# Patient Record
Sex: FEMALE | Race: WHITE
[De-identification: names, ages, dates, MRNs, and addresses within clinical notes are randomized per-mention and may not be internally consistent; named-entity substitution may affect disease eponyms.]

---

## 2023-04-21 PROBLEM — J30.89 ENVIRONMENTAL AND SEASONAL ALLERGIES: Status: ACTIVE | Noted: 2023-04-21

## 2023-04-21 PROBLEM — L50.3 DERMOGRAPHISM: Status: ACTIVE | Noted: 2023-04-21

## 2023-05-20 RX ORDER — NORETHINDRONE ACETATE AND ETHINYL ESTRADIOL 1; .02 MG/1; MG/1
1 TABLET ORAL DAILY
COMMUNITY

## 2023-05-20 RX ORDER — CETIRIZINE HYDROCHLORIDE 10 MG/1
TABLET ORAL
COMMUNITY

## 2023-05-20 RX ORDER — LEVOTHYROXINE AND LIOTHYRONINE 19; 4.5 UG/1; UG/1
30 TABLET ORAL DAILY
COMMUNITY
Start: 2023-04-21

## 2023-07-13 ENCOUNTER — TRANSCRIBE ORDERS (OUTPATIENT)
Facility: HOSPITAL | Age: 23
End: 2023-07-13

## 2023-07-13 DIAGNOSIS — N63.0 BREAST LUMP IN FEMALE: Primary | ICD-10-CM

## 2023-07-19 ENCOUNTER — CLINICAL DOCUMENTATION (OUTPATIENT)
Age: 23
End: 2023-07-19

## 2023-08-04 ENCOUNTER — HOSPITAL ENCOUNTER (OUTPATIENT)
Facility: HOSPITAL | Age: 23
Discharge: HOME | End: 2023-08-04
Payer: COMMERCIAL

## 2023-08-04 DIAGNOSIS — N63.0 BREAST LUMP IN FEMALE: ICD-10-CM

## 2023-08-04 PROCEDURE — 76642 ULTRASOUND BREAST LIMITED: CPT

## 2023-08-09 NOTE — PROGRESS NOTES
[facility]  Cancer Genetics Program at [facility] Visit      Name: Bin Choi  Age: 24 y. o.  : 2000  Diagnosis: Family history of breast cancer. SUMMARY  Genetic Counseling was provided  Germline genetic testing was recommended  The patient elected to proceed with germline testing  A multi gene panel [facility]  Results are expected in 21-28 days, likely the first week of September    REASON FOR VISIT    Bin Choi is a 24 y.o. female who presents today for genetic counseling due to concern for a hereditary cancer syndrome. The patient is being referred by her OB/GYN, Dr. Luis Enrique Beltre MD at [facility]. The goal of today's visit is to review personal medical and family history, provide cancer risk assessment, discuss benefits and limitations of any indicated genetic testing options     HPI  Ms. Babs Sever reports that she recently felt an area of concern in her right breast and underwent an ultrasound which showed no worrisome findings. She explains that she has a family history of breast cancer and is interested in learning more about genetic testing for helping inform her risk and future screening strategies. Family History  A three-generation pedigree was collected at this appointment and is scanned into the patient's chart. The family history is notable for:    Maternal  Mother: 48 yo, history of fibrocystic breasts, thyroid disease, celiac disease. Intact uterus and ovaries  Patient's mother is an only child  Candis Chung 75 yo, diagnosed with Bilateral breast cancer in her 62s  M. Great Aunt  80s stroke, history of breast cancer in her early 62s    Paternal  Father 55 yo, history of thyroid disease and Type II diabetes  P. Aunt 47 yo, celiac disease  P. Grandmother and Grandfather in their 76s, diabetes.     Siblings  Sister 28 yo, hypothyroidism, no cancers    The patient does not have any children  [place]

## 2023-08-11 ENCOUNTER — OFFICE VISIT (OUTPATIENT)
Age: 23
End: 2023-08-11

## 2023-08-11 DIAGNOSIS — Z13.71 ENCOUNTER FOR NONPROCREATIVE GENETIC COUNSELING AND TESTING: Primary | ICD-10-CM

## 2023-08-11 DIAGNOSIS — Z80.3 FAMILY HISTORY OF MALIGNANT NEOPLASM OF BREAST: ICD-10-CM

## 2023-08-11 DIAGNOSIS — Z71.83 ENCOUNTER FOR NONPROCREATIVE GENETIC COUNSELING AND TESTING: Primary | ICD-10-CM

## 2023-08-30 ENCOUNTER — TELEPHONE (OUTPATIENT)
Age: 23
End: 2023-08-30

## 2023-08-31 ENCOUNTER — TELEPHONE (OUTPATIENT)
Age: 23
End: 2023-08-31

## 2023-08-31 NOTE — TELEPHONE ENCOUNTER
[place]  Cancer Genetics Program at [facility]    Discussion of Results    Name: Shell Segundo  Age: 24 y. o.  : 2000      HPI  Shell Segundo is a 24 y.o. female who recently underwent germline genetic testing. The patient's family history raised concerns for hereditary cancer. The patient was referred by Dr. Dana Wilkins. Family History  A three-generation pedigree was collected during her initial appointment and is scanned into the patient's chart. The family history is notable for:     Maternal  Mother: 48 yo, history of fibrocystic breasts, thyroid disease, celiac disease. Intact uterus and ovaries  Patient's mother is an only child  Sammuel Emms 77 yo, diagnosed with Bilateral breast cancer in her 62s  M. Great Aunt  80s stroke, history of breast cancer in her early 62s     Paternal  Father 55 yo, history of thyroid disease and Type II diabetes  P. Aunt 47 yo, celiac disease  P. Grandmother and Grandfather in their 76s, diabetes. Siblings  Sister 30 yo, hypothyroidism, no cancers     The patient does not have any children  Ancestry Paternal: , Maternal: Unknown. Denies [employer] ancestry     Following a comprehensive review of medical and family history the patient elected to move forward with germline testing. The specific panel [facility]red[facility] Panel     Genes Analyzed (36 total): APC, SILKE, AXIN2, BARD1, BMPR1A, BRCA1, BRCA2, BRIP1, CDH1, CDK4, CDKN2A, CHEK2, DICER1, MLH1, MSH2, MSH3,MSH6, MUTYH, NBN, NF1, NTHL1, PALB2, PMS2, PTEN, RAD51C, RAD51D, RECQL, SMAD4, SMARCA4, STK11 and TP53 (sequencing and deletion/duplication); HOXB13, POLD1 and POLE (sequencing only); EPCAM and GREM1 (deletion/duplication only)     I spoke with the patient over the phone to discuss genetic test results. The goal of this discussion was to review results, summarize risks, and discuss next steps.     RESULTS    The following